# Patient Record
Sex: MALE | URBAN - METROPOLITAN AREA
[De-identification: names, ages, dates, MRNs, and addresses within clinical notes are randomized per-mention and may not be internally consistent; named-entity substitution may affect disease eponyms.]

---

## 2018-04-12 ENCOUNTER — APPOINTMENT (RX ONLY)
Dept: URBAN - METROPOLITAN AREA CLINIC 75 | Facility: CLINIC | Age: 48
Setting detail: DERMATOLOGY
End: 2018-04-12

## 2018-04-12 VITALS — WEIGHT: 215 LBS | HEIGHT: 73 IN

## 2018-04-12 DIAGNOSIS — L71.8 OTHER ROSACEA: ICD-10-CM

## 2018-04-12 DIAGNOSIS — Z87.2 PERSONAL HISTORY OF DISEASES OF THE SKIN AND SUBCUTANEOUS TISSUE: ICD-10-CM

## 2018-04-12 DIAGNOSIS — L82.1 OTHER SEBORRHEIC KERATOSIS: ICD-10-CM

## 2018-04-12 DIAGNOSIS — L91.8 OTHER HYPERTROPHIC DISORDERS OF THE SKIN: ICD-10-CM

## 2018-04-12 PROBLEM — J30.1 ALLERGIC RHINITIS DUE TO POLLEN: Status: ACTIVE | Noted: 2018-04-12

## 2018-04-12 PROBLEM — L57.0 ACTINIC KERATOSIS: Status: ACTIVE | Noted: 2018-04-12

## 2018-04-12 PROBLEM — L20.84 INTRINSIC (ALLERGIC) ECZEMA: Status: ACTIVE | Noted: 2018-04-12

## 2018-04-12 PROCEDURE — ? IN-HOUSE DISPENSING PHARMACY

## 2018-04-12 PROCEDURE — 99214 OFFICE O/P EST MOD 30 MIN: CPT

## 2018-04-12 PROCEDURE — ? COUNSELING

## 2018-04-12 PROCEDURE — ? TREATMENT REGIMEN

## 2018-04-12 ASSESSMENT — LOCATION ZONE DERM: LOCATION ZONE: FACE

## 2018-04-12 ASSESSMENT — LOCATION DETAILED DESCRIPTION DERM
LOCATION DETAILED: RIGHT INFERIOR LATERAL FOREHEAD
LOCATION DETAILED: LEFT SUPERIOR CENTRAL MALAR CHEEK
LOCATION DETAILED: RIGHT CENTRAL MALAR CHEEK

## 2018-04-12 ASSESSMENT — LOCATION SIMPLE DESCRIPTION DERM
LOCATION SIMPLE: LEFT CHEEK
LOCATION SIMPLE: RIGHT CHEEK
LOCATION SIMPLE: RIGHT FOREHEAD

## 2018-04-12 NOTE — PROCEDURE: IN-HOUSE DISPENSING PHARMACY
Product 66 Refills: 0
Name Of Product 11: Tacro Ointment - 130238
Product 9 Refills: 6
Product 3 Unit Type: grams
Product 3 Amount/Unit (Numbers Only): 30
Product 2 Price/Unit (In Dollars): 40.00
Product 5 Amount/Unit (Numbers Only): 30
Product 15 Amount/Unit (Numbers Only): 60
Product 22 Unit Type: mg
Name Of Product 7: Hydroquin Combo Cream - [id number]
Product 14 Unit Type: cc's
Name Of Product 9: Imiqui/Levo Gel - [id number]
Name Of Product 6: Adap Combo Cream - 224881
Product 9 Martin/Unit (In Dollars): 50.00
Product 3 Application Directions: Apply to affected area before moisturizer one time a day.
Product 4 Application Directions: Apply to affected area in the evening after moisturizer.  Avoid eyelids.
Product 8 Units Dispensed: 1
Render Product Pricing In Note: Yes
Product 6 Application Directions: Apply to affected area after moisturizer one time a day.
Product 10 Amount/Unit (Numbers Only): 60
Name Of Product 4: Taza 0.1% Cream - [id number]
Product 11 Application Directions: Apply to affected areas BID.
Name Of Product 8: Ivermec Met Gel - 034504
Name Of Product 3: Acne Gel w/ Dapsone 7.5% - 427662
Product 9 Application Directions: Apply to affected areas one time daily or every other day.
Product 7 Application Directions: Apply to affected areas after moisturizer at night.
Product 5 Application Directions: Apply to acne prone areas after moisturizer one time daily.
Product 1 Application Directions: Apply to affected area in evening after moisturizer. Avoid eyelids.
Detail Level: Zone
Name Of Product 10: Clob Ointment - [id number]
Product 2 Application Directions: Apply to affected area in the evening after moisturizer.  Avoid eyelids.
Name Of Product 1: Clind/Tret Combo Cream - 499348
Product 10 Application Directions: Apply to affected areas one time per day.
Product 8 Application Directions: Apply to affected areas once daily.
Product 10 Price/Unit (In Dollars): 45.00
Name Of Product 2: Tret 0.05% Cream - 508554
Name Of Product 5: Chirag/Clind Combo Gel - 963272
Product 51 Amount/Unit (Numbers Only): 15

## 2019-06-27 ENCOUNTER — APPOINTMENT (RX ONLY)
Dept: URBAN - METROPOLITAN AREA CLINIC 52 | Facility: CLINIC | Age: 49
Setting detail: DERMATOLOGY
End: 2019-06-27

## 2019-06-27 DIAGNOSIS — Z71.89 OTHER SPECIFIED COUNSELING: ICD-10-CM

## 2019-06-27 DIAGNOSIS — L71.8 OTHER ROSACEA: ICD-10-CM

## 2019-06-27 DIAGNOSIS — D18.0 HEMANGIOMA: ICD-10-CM

## 2019-06-27 DIAGNOSIS — D22 MELANOCYTIC NEVI: ICD-10-CM

## 2019-06-27 DIAGNOSIS — L81.4 OTHER MELANIN HYPERPIGMENTATION: ICD-10-CM

## 2019-06-27 DIAGNOSIS — L57.0 ACTINIC KERATOSIS: ICD-10-CM

## 2019-06-27 PROBLEM — D18.01 HEMANGIOMA OF SKIN AND SUBCUTANEOUS TISSUE: Status: ACTIVE | Noted: 2019-06-27

## 2019-06-27 PROBLEM — L85.3 XEROSIS CUTIS: Status: ACTIVE | Noted: 2019-06-27

## 2019-06-27 PROBLEM — D22.9 MELANOCYTIC NEVI, UNSPECIFIED: Status: ACTIVE | Noted: 2019-06-27

## 2019-06-27 PROCEDURE — ? COUNSELING

## 2019-06-27 PROCEDURE — ? LIQUID NITROGEN

## 2019-06-27 PROCEDURE — 17003 DESTRUCT PREMALG LES 2-14: CPT

## 2019-06-27 PROCEDURE — 17000 DESTRUCT PREMALG LESION: CPT

## 2019-06-27 PROCEDURE — ? ADDITIONAL NOTES

## 2019-06-27 PROCEDURE — 99213 OFFICE O/P EST LOW 20 MIN: CPT | Mod: 25

## 2019-06-27 ASSESSMENT — LOCATION ZONE DERM
LOCATION ZONE: HAND
LOCATION ZONE: FACE

## 2019-06-27 ASSESSMENT — LOCATION DETAILED DESCRIPTION DERM
LOCATION DETAILED: LEFT INFERIOR CENTRAL MALAR CHEEK
LOCATION DETAILED: LEFT CENTRAL MALAR CHEEK
LOCATION DETAILED: RIGHT ULNAR DORSAL HAND

## 2019-06-27 ASSESSMENT — LOCATION SIMPLE DESCRIPTION DERM
LOCATION SIMPLE: RIGHT HAND
LOCATION SIMPLE: LEFT CHEEK

## 2019-06-27 ASSESSMENT — PAIN INTENSITY VAS: HOW INTENSE IS YOUR PAIN 0 BEING NO PAIN, 10 BEING THE MOST SEVERE PAIN POSSIBLE?: NO PAIN

## 2019-06-27 NOTE — PROCEDURE: LIQUID NITROGEN
Render Note In Bullet Format When Appropriate: No
Duration Of Freeze Thaw-Cycle (Seconds): 7
Number Of Freeze-Thaw Cycles: 2 freeze-thaw cycles
Post-Care Instructions: I reviewed with the patient in detail post-care instructions. Patient is to wear sunprotection, and avoid picking at any of the treated lesions. Pt may apply Vaseline to crusted or scabbing areas.
Detail Level: Detailed
Consent: The patient's consent was obtained including but not limited to risks of crusting, scabbing, blistering, scarring, darker or lighter pigmentary change, recurrence, incomplete removal and infection.

## 2019-06-27 NOTE — PROCEDURE: ADDITIONAL NOTES
Additional Notes: Patient to return if not resolved after healing, discussed biopsy, deferred to to beard hair and risk of permanent baldness
Detail Level: Simple
Additional Notes: Discussed IPL treatment for residual redness across the face. $300 full face/$150 cheeks.\\n\\nAlso, good SPF daily.